# Patient Record
(demographics unavailable — no encounter records)

---

## 2017-12-06 NOTE — RAD
RIGHT HAND THREE VIEWS:

 

INDICATIONS:

Right hand pain.

 

FINDINGS:

There is a mildly angulated fracture at the fifth metacarpal neck with overlying soft tissue swelling
.  No significant displacement.  There is subtle fracture lucency at the posteromedial aspect of the 
articular surface.

 

IMPRESSION:

Proximal fifth metacarpal fracture with intraarticular extension, slight angulation, and overlying so
ft tissue swelling.

 

POS: CET